# Patient Record
Sex: MALE | Race: BLACK OR AFRICAN AMERICAN | URBAN - METROPOLITAN AREA
[De-identification: names, ages, dates, MRNs, and addresses within clinical notes are randomized per-mention and may not be internally consistent; named-entity substitution may affect disease eponyms.]

---

## 2024-01-12 ENCOUNTER — OFFICE VISIT (OUTPATIENT)
Dept: URBAN - METROPOLITAN AREA CLINIC 115 | Facility: CLINIC | Age: 29
End: 2024-01-12

## 2024-01-12 NOTE — HPI-TODAY'S VISIT:
29 y/o M with PMH of seizures previously seen by Dr. Ortiz presents with c/o abdominal pain. Denies abdominal pain, n/v, diarrhea, constipation, dysphagia, odynophagia, heartburn, blood in stool/vomit, unintentional weight loss, change in appetite, early satiety.  Denies FHx of GI Naresh, IBD. Denies NSAID use, EtOH/tobacco/marijuana use. Previous abdominal surgeries include:  EGD 2018 with Dr. Ortiz: erosive gastropathy negative for H. pylori

## 2024-01-22 ENCOUNTER — OFFICE VISIT (OUTPATIENT)
Dept: URBAN - METROPOLITAN AREA CLINIC 115 | Facility: CLINIC | Age: 29
End: 2024-01-22
Payer: COMMERCIAL

## 2024-01-22 ENCOUNTER — DASHBOARD ENCOUNTERS (OUTPATIENT)
Age: 29
End: 2024-01-22

## 2024-01-22 ENCOUNTER — LAB OUTSIDE AN ENCOUNTER (OUTPATIENT)
Dept: URBAN - METROPOLITAN AREA CLINIC 115 | Facility: CLINIC | Age: 29
End: 2024-01-22

## 2024-01-22 VITALS
HEART RATE: 60 BPM | SYSTOLIC BLOOD PRESSURE: 146 MMHG | TEMPERATURE: 97.7 F | WEIGHT: 304 LBS | HEIGHT: 74 IN | DIASTOLIC BLOOD PRESSURE: 61 MMHG | BODY MASS INDEX: 39.01 KG/M2

## 2024-01-22 DIAGNOSIS — R10.32 LLQ PAIN: ICD-10-CM

## 2024-01-22 DIAGNOSIS — K21.9 GASTROESOPHAGEAL REFLUX DISEASE, UNSPECIFIED WHETHER ESOPHAGITIS PRESENT: ICD-10-CM

## 2024-01-22 PROBLEM — 235595009: Status: ACTIVE | Noted: 2024-01-22

## 2024-01-22 PROCEDURE — 99203 OFFICE O/P NEW LOW 30 MIN: CPT

## 2024-01-22 RX ORDER — DICYCLOMINE HYDROCHLORIDE 20 MG/1
1 TABLET 30MINUTESBEFORE MEALS TABLET ORAL
Qty: 120 TABLET | Refills: 0 | OUTPATIENT
Start: 2024-01-22 | End: 2024-02-21

## 2024-01-22 NOTE — HPI-TODAY'S VISIT:
27 y/o M with PMH of epilepsy (hasn't taken Depakote since 2016, no recent episodes) presents with c/o sharp, crampy LLQ abdominal pain that sometimes moves to RLQ x3 mos. States the pain is intermittent and fluctuates in intensity. Tried heating pad which sometimes doesn't do anything. Reports bloating, reflux with throat burning. Remembers that when he was on omeprazole 20mg, it didn't help. Denies n/v, diarrhea, constipation, dysphagia, odynophagia, blood in stool, unintentional weight loss, change in appetite, early satiety. Denies FHx of GI Naresh, IBD. Denies NSAID use. Denies EtOH. Denies tobacco/marijuana use. Had EGD in 2018 with Dr. Ortiz which showed reactive gastropathy with stigmata of recent bleeding.

## 2024-01-24 ENCOUNTER — TELEPHONE ENCOUNTER (OUTPATIENT)
Dept: URBAN - METROPOLITAN AREA CLINIC 115 | Facility: CLINIC | Age: 29
End: 2024-01-24

## 2024-04-01 ENCOUNTER — OV EP (OUTPATIENT)
Dept: URBAN - METROPOLITAN AREA CLINIC 115 | Facility: CLINIC | Age: 29
End: 2024-04-01

## 2024-04-01 NOTE — HPI-TODAY'S VISIT:
1/22/24: 27 y/o M with PMH of epilepsy (hasn't taken Depakote since 2016, no recent episodes) presents with c/o sharp, crampy LLQ abdominal pain that sometimes moves to RLQ x3 mos. States the pain is intermittent and fluctuates in intensity. Tried heating pad which sometimes doesn't do anything. Reports bloating, reflux with throat burning. Remembers that when he was on omeprazole 20mg, it didn't help. Denies n/v, diarrhea, constipation, dysphagia, odynophagia, blood in stool, unintentional weight loss, change in appetite, early satiety. Denies FHx of GI Naresh, IBD. Denies NSAID use. Denies EtOH. Denies tobacco/marijuana use. Had EGD in 2018 with Dr. Ortiz which showed reactive gastropathy with stigmata of recent bleeding. 4/1/24: pt presents for follow up. CT not done. Bentyl given for LLQ pain, antireflux diet

## 2024-04-15 ENCOUNTER — OV EP (OUTPATIENT)
Dept: URBAN - METROPOLITAN AREA CLINIC 115 | Facility: CLINIC | Age: 29
End: 2024-04-15

## 2024-04-15 RX ORDER — DICYCLOMINE HYDROCHLORIDE 20 MG/1
1 TABLET 30MINUTESBEFORE MEALS ORALLY FOUR TIMES A DAY 30 DAYS TABLET ORAL
Qty: 360 TABLET | Refills: 1 | Status: ACTIVE | COMMUNITY

## 2024-04-15 NOTE — HPI-TODAY'S VISIT:
1/22/24: 29 y/o M with PMH of epilepsy (hasn't taken Depakote since 2016, no recent episodes) presents with c/o sharp, crampy LLQ abdominal pain that sometimes moves to RLQ x3 mos. States the pain is intermittent and fluctuates in intensity. Tried heating pad which sometimes doesn't do anything. Reports bloating, reflux with throat burning. Remembers that when he was on omeprazole 20mg, it didn't help. Denies n/v, diarrhea, constipation, dysphagia, odynophagia, blood in stool, unintentional weight loss, change in appetite, early satiety. Denies FHx of GI Naresh, IBD. Denies NSAID use. Denies EtOH. Denies tobacco/marijuana use. Had EGD in 2018 with Dr. Ortiz which showed reactive gastropathy with stigmata of recent bleeding. 4/15/24: pt presents for follow up. CT scheduled for 4/17. Bentyl given for LLQ pain, antireflux diet

## 2024-04-29 ENCOUNTER — OV EP (OUTPATIENT)
Dept: URBAN - METROPOLITAN AREA CLINIC 115 | Facility: CLINIC | Age: 29
End: 2024-04-29